# Patient Record
Sex: FEMALE | Race: WHITE | URBAN - METROPOLITAN AREA
[De-identification: names, ages, dates, MRNs, and addresses within clinical notes are randomized per-mention and may not be internally consistent; named-entity substitution may affect disease eponyms.]

---

## 2022-02-09 ENCOUNTER — OFFICE VISIT (OUTPATIENT)
Dept: URBAN - METROPOLITAN AREA CLINIC 26 | Facility: CLINIC | Age: 57
End: 2022-02-09
Payer: COMMERCIAL

## 2022-02-09 DIAGNOSIS — H43.811 VITREOUS DEGENERATION, RIGHT EYE: ICD-10-CM

## 2022-02-09 DIAGNOSIS — H04.123 TEAR FILM INSUFFICIENCY OF BILATERAL LACRIMAL GLANDS: Primary | ICD-10-CM

## 2022-02-09 PROCEDURE — 92134 CPTRZ OPH DX IMG PST SGM RTA: CPT | Performed by: OPTOMETRIST

## 2022-02-09 PROCEDURE — 99203 OFFICE O/P NEW LOW 30 MIN: CPT | Performed by: OPTOMETRIST

## 2022-02-09 RX ORDER — CYCLOSPORINE 0.5 MG/ML
0.05 % EMULSION OPHTHALMIC
Qty: 180 | Refills: 3 | Status: INACTIVE
Start: 2022-02-09 | End: 2022-05-09

## 2022-02-09 RX ORDER — LOTEPREDNOL ETABONATE 2.5 MG/ML
0.25 % SUSPENSION/ DROPS OPHTHALMIC
Qty: 5 | Refills: 0 | Status: ACTIVE
Start: 2022-02-09

## 2022-02-09 ASSESSMENT — INTRAOCULAR PRESSURE
OS: 10
OD: 10

## 2022-02-09 NOTE — IMPRESSION/PLAN
Impression: Tear film insufficiency of bilateral lacrimal glands: H04.123. Plan: Recommend artificial tears at least 4 times a day and gel drop or tear ointment at bedtime, Omega 3 fatty acids (2-3,000 mg) daily. Start eysuvis QID OU x 1 week, then BID OU x 1 week. rtc 3-4 month dry eye f/u.

## 2022-02-09 NOTE — IMPRESSION/PLAN
Impression: Vitreous degeneration, right eye: H43.811. Plan:  No retinal pathology. No family history, prior peripheral retinal disease, or other risk factors evident. Warning signs of retinal tear and detachment discussed with patient. To return to clinic STAT if any change in symptoms consistent with RT or RD.

-floaters are longstanding OD. x 2 years. very bothersome to pt and affecting functioning. ok to schedule next available consult with retina.

## 2022-03-02 ENCOUNTER — OFFICE VISIT (OUTPATIENT)
Dept: URBAN - METROPOLITAN AREA CLINIC 10 | Facility: CLINIC | Age: 57
End: 2022-03-02
Payer: COMMERCIAL

## 2022-03-02 DIAGNOSIS — H25.13 AGE-RELATED NUCLEAR CATARACT, BILATERAL: ICD-10-CM

## 2022-03-02 DIAGNOSIS — H43.813 VITREOUS DEGENERATION, BILATERAL: Primary | ICD-10-CM

## 2022-03-02 PROCEDURE — 99204 OFFICE O/P NEW MOD 45 MIN: CPT | Performed by: OPHTHALMOLOGY

## 2022-03-02 RX ORDER — PREDNISOLONE ACETATE 10 MG/ML
1 % SUSPENSION/ DROPS OPHTHALMIC
Qty: 5 | Refills: 0 | Status: ACTIVE
Start: 2022-03-02

## 2022-03-02 RX ORDER — OFLOXACIN 3 MG/ML
0.3 % SOLUTION/ DROPS OPHTHALMIC
Qty: 5 | Refills: 0 | Status: INACTIVE
Start: 2022-03-02 | End: 2022-03-08

## 2022-03-02 ASSESSMENT — INTRAOCULAR PRESSURE
OS: 19
OD: 13

## 2022-03-02 NOTE — IMPRESSION/PLAN
Impression: Age-related nuclear cataract, bilateral: H25.13.  Plan: understands accelerated growth after PPV

## 2022-03-02 NOTE — IMPRESSION/PLAN
Impression: Vitreous degeneration, bilateral: H43.813. Plan: visually significant opacities that affect ability to read/drive. disc r/b/a, elected for repair OD

RTC PPV OD
20 mins [id number]

## 2022-03-21 ENCOUNTER — ADULT PHYSICAL (OUTPATIENT)
Dept: URBAN - METROPOLITAN AREA CLINIC 24 | Facility: CLINIC | Age: 57
End: 2022-03-21
Payer: COMMERCIAL

## 2022-03-21 DIAGNOSIS — Z01.818 ENCOUNTER FOR OTHER PREPROCEDURAL EXAMINATION: Primary | ICD-10-CM

## 2022-03-21 PROCEDURE — 99203 OFFICE O/P NEW LOW 30 MIN: CPT | Performed by: PHYSICIAN ASSISTANT

## 2022-03-21 RX ORDER — DULOXETINE HYDROCHLORIDE 60 MG/1
60 MG CAPSULE, DELAYED RELEASE ORAL
Qty: 0 | Refills: 0 | Status: ACTIVE
Start: 2022-03-21

## 2022-03-21 RX ORDER — LEVOTHYROXINE SODIUM 50 UG/1
TABLET ORAL
Qty: 0 | Refills: 0 | Status: ACTIVE
Start: 2022-03-21

## 2022-03-21 RX ORDER — SIMVASTATIN 40 MG/1
40 MG TABLET, FILM COATED ORAL
Qty: 0 | Refills: 0 | Status: ACTIVE
Start: 2022-03-21

## 2022-03-30 ENCOUNTER — SURGERY (OUTPATIENT)
Dept: URBAN - METROPOLITAN AREA SURGERY 5 | Facility: SURGERY | Age: 57
End: 2022-03-30
Payer: COMMERCIAL

## 2022-03-30 PROCEDURE — 67036 REMOVAL OF INNER EYE FLUID: CPT | Performed by: OPHTHALMOLOGY

## 2022-03-31 ENCOUNTER — POST-OPERATIVE VISIT (OUTPATIENT)
Dept: URBAN - METROPOLITAN AREA CLINIC 26 | Facility: CLINIC | Age: 57
End: 2022-03-31
Payer: COMMERCIAL

## 2022-03-31 DIAGNOSIS — Z48.810 ENCOUNTER FOR SURGICAL AFTERCARE FOLLOWING SURGERY ON A SENSE ORGAN: Primary | ICD-10-CM

## 2022-03-31 PROCEDURE — 99024 POSTOP FOLLOW-UP VISIT: CPT | Performed by: OPTOMETRIST

## 2022-03-31 ASSESSMENT — INTRAOCULAR PRESSURE: OD: 14

## 2022-03-31 NOTE — IMPRESSION/PLAN
Impression: S/P Posterior Vitrectomy (PPvit) OD - 1 Day. Encounter for surgical aftercare following surgery on a sense organ  Z48.810. Excellent post op course   Post operative instructions reviewed - Condition is improving - Plan: used drops as instructed.  
rtc as scheduled with Dr. Aryan Ibrahim or prn